# Patient Record
(demographics unavailable — no encounter records)

---

## 2024-10-30 NOTE — HISTORY OF PRESENT ILLNESS
[de-identified] : 10/21/2024 -Normal upper and middle third of the esophagus -LA grade C reflux esophagitis.  Rule out Sosa's esophagus.  Biopsied -Z-line, 41 cm from the incisors. -4 cm hiatal hernia. -Erythematous mucosa in the antrum.  Biopsied. -Mucosal nodule found in the duodenum.  Biopsied. [FreeTextEntry1] : 10/21/2024 -The examined portion of the ileum was normal -Polypoid lesion at the appendiceal orifices.  Biopsied -Two 4 to 6 mm polyps in the transverse colon and in the ascending colon, removed with a cold snare.  Resected and retrieved. -Diverticulosis in the sigmoid colon. -Nonbleeding internal hemorrhoids. -The examination was otherwise normal on direct and retroflexed centimeters views.

## 2024-10-30 NOTE — ASSESSMENT
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  #Sosa's I explained to patient the pathophysiology of BE in details and how we risk stratify by dysplasia. I also explained that we only eradicate/ablate BE with dysplasia (a process that can last 1.5 years for long segment BE) as the progression rate of NDBE is low and the risk of ablation outweighs the risk of progression of NDBE. In addition, insurance companies to do not cover ablation of nondysplastic BE.  We discussed the importance of continuing the PPI (omeprazole) and increasing it to 40 mg BID given his endoscopy showed LA Grade C reflux esophagitis. Explained this will prevent false-positive results on repeat endoscopy/biopsies. I explained that we can do a an EGD with high-definition white light endoscopy, advanced imaging, and WATS-3D brush biopsies with AI in addition to standard biopsies. Patient agreeable to proceed.  Finally, we also went over GERD Behavorial modifications avoiding (mint, tea, chocolate, alcohol, spicy/citric food, tomato sauce, wine).  #Sessile serrated polyp We discussed the indication for Endo mucosal resection of the noted 8 mm polypoid lesion found in the appendiceal orifices.  Discussed the biopsy results as a premalignant polyp.  We discussed the proposed Colonoscopy w/ EMR procedure, preparation and alternatives.  The risks (bleeding, perforation, infection) and benefits were discussed with the patient in details.  Patient understands the risks/benefits and agreed to proceed with the planned procedure. He was advised that the bowel prep will need to be started the day before procedure along with following a Low residue diet 7 days prior to procedure.  Plan 1.  Colon EMR/EGD with Dr. Wong on 12/11/2024 2.  Start PPI BID  3.  Instructions to be mailed  All questions and concerns answered.   Patient is aware to call me with any further concerns.

## 2024-10-30 NOTE — REASON FOR VISIT
[Home] : at home, [unfilled] , at the time of the visit. [Medical Office: ([facility])___] : at the medical office located in  [Patient] : the patient [Self] : self [Consultation] : a consultation visit [Spouse] : spouse [FreeTextEntry1] : Referred by Dr Oleary for appendiceal orifices polyp and Barretts Esophagus.